# Patient Record
Sex: MALE | Race: WHITE
[De-identification: names, ages, dates, MRNs, and addresses within clinical notes are randomized per-mention and may not be internally consistent; named-entity substitution may affect disease eponyms.]

---

## 2018-02-12 ENCOUNTER — HOSPITAL ENCOUNTER (OUTPATIENT)
Dept: HOSPITAL 47 - OR | Age: 63
Discharge: HOME | End: 2018-02-12
Payer: COMMERCIAL

## 2018-02-12 VITALS — TEMPERATURE: 97.1 F | RESPIRATION RATE: 16 BRPM

## 2018-02-12 VITALS — BODY MASS INDEX: 30.1 KG/M2

## 2018-02-12 VITALS — HEART RATE: 62 BPM | DIASTOLIC BLOOD PRESSURE: 70 MMHG | SYSTOLIC BLOOD PRESSURE: 111 MMHG

## 2018-02-12 DIAGNOSIS — N28.9: ICD-10-CM

## 2018-02-12 DIAGNOSIS — Z85.820: ICD-10-CM

## 2018-02-12 DIAGNOSIS — G56.01: Primary | ICD-10-CM

## 2018-02-12 DIAGNOSIS — E78.5: ICD-10-CM

## 2018-02-12 DIAGNOSIS — Z79.899: ICD-10-CM

## 2018-02-12 DIAGNOSIS — Z79.82: ICD-10-CM

## 2018-02-12 DIAGNOSIS — I10: ICD-10-CM

## 2018-02-12 DIAGNOSIS — Z87.891: ICD-10-CM

## 2018-02-12 DIAGNOSIS — M18.0: ICD-10-CM

## 2018-02-12 DIAGNOSIS — E89.0: ICD-10-CM

## 2018-02-12 PROCEDURE — 64721 CARPAL TUNNEL SURGERY: CPT

## 2018-02-12 NOTE — OP
OPERATIVE REPORT



DATE OF SERVICE:

02/12/2018.



PREOPERATIVE DIAGNOSIS:

Right carpal tunnel syndrome.



POSTOPERATIVE DIAGNOSIS:

Right carpal tunnel syndrome.



DESCRIPTION OF PROCEDURE:

The patient was taken to the Operative Suite where a sedation was administered by the

Department of Anesthesia.  I then performed a local injection along the line of the

incision with a combination of Marcaine and Xylocaine both without epinephrine.



The hand was then prepped and draped in the usual manner.  The arm was elevated,

exsanguinated and the cuff was inflated to 250 mm of mercury.



A longitudinal incision was made along the ring finger ray distal to the wrist crease.

Dissection was taken through the skin and subcutaneous tissue, initially sharp through

the skin and then blunt through the subcutaneous tissue to ensure protection of any

potential terminal transverse branches of the palmar cutaneous nerve.



The palmar fascia was then incised under direct vision longitudinally exposing the

transverse carpal ligament.  The transverse carpal ligament also was incised under

direct vision.  The dissection was then continued proximally beneath the skin under

direct vision to release the distal forearm fascia.



The median nerve was then reflected free of tenosynovium to ensure no adhesions.



The tourniquet was then released.  The wound was then irrigated and the skin was closed

with a running 5-0 nylon suture. A soft bulky dressing was applied including a volar

plaster splint holding the wrist in a neutral slightly extended position.  The patient

was then taken to the Recovery Room in satisfactory condition.



BARB / MARIE: [id number] / Job#: [id number]

## 2019-01-25 ENCOUNTER — HOSPITAL ENCOUNTER (OUTPATIENT)
Dept: HOSPITAL 47 - LABWHC1 | Age: 64
Discharge: HOME | End: 2019-01-25
Attending: INTERNAL MEDICINE
Payer: COMMERCIAL

## 2019-01-25 DIAGNOSIS — R73.09: ICD-10-CM

## 2019-01-25 DIAGNOSIS — Z12.5: ICD-10-CM

## 2019-01-25 DIAGNOSIS — I12.9: ICD-10-CM

## 2019-01-25 DIAGNOSIS — N18.9: ICD-10-CM

## 2019-01-25 DIAGNOSIS — E78.5: Primary | ICD-10-CM

## 2019-01-25 LAB
ALBUMIN SERPL-MCNC: 4.4 G/DL (ref 3.8–4.9)
ALBUMIN/GLOB SERPL: 2.2 G/DL (ref 1.2–2.1)
ALP SERPL-CCNC: 37 U/L (ref 41–126)
ALT SERPL-CCNC: 33 U/L (ref 10–49)
ANION GAP SERPL CALC-SCNC: 7.4 MMOL/L (ref 4–12)
AST SERPL-CCNC: 37 U/L (ref 14–35)
BASOPHILS # BLD AUTO: 0.1 K/UL (ref 0–0.2)
BASOPHILS NFR BLD AUTO: 1 %
BUN SERPL-SCNC: 24 MG/DL (ref 9–27)
CALCIUM SPEC-MCNC: 9.9 MG/DL (ref 8.7–10.3)
CHLORIDE SERPL-SCNC: 109 MMOL/L (ref 96–109)
CHOLEST SERPL-MCNC: 176 MG/DL (ref 0–200)
CO2 SERPL-SCNC: 25.6 MMOL/L (ref 21.6–31.8)
EOSINOPHIL # BLD AUTO: 0.5 K/UL (ref 0–0.7)
EOSINOPHIL NFR BLD AUTO: 7 %
ERYTHROCYTE [DISTWIDTH] IN BLOOD BY AUTOMATED COUNT: 5.7 M/UL (ref 4.3–5.9)
ERYTHROCYTE [DISTWIDTH] IN BLOOD: 14.8 % (ref 11.5–15.5)
GLOBULIN SER CALC-MCNC: 2 G/DL (ref 1.6–3.3)
GLUCOSE SERPL-MCNC: 97 MG/DL (ref 70–110)
HBA1C MFR BLD: 6.3 % (ref 4–6)
HCT VFR BLD AUTO: 51.4 % (ref 39–53)
HDLC SERPL-MCNC: 31 MG/DL (ref 40–60)
HGB BLD-MCNC: 17 GM/DL (ref 13–17.5)
LDLC SERPL CALC-MCNC: 101.6 MG/DL (ref 0–131)
LYMPHOCYTES # SPEC AUTO: 2.4 K/UL (ref 1–4.8)
LYMPHOCYTES NFR SPEC AUTO: 37 %
MCH RBC QN AUTO: 29.8 PG (ref 25–35)
MCHC RBC AUTO-ENTMCNC: 33 G/DL (ref 31–37)
MCV RBC AUTO: 90.3 FL (ref 80–100)
MONOCYTES # BLD AUTO: 0.6 K/UL (ref 0–1)
MONOCYTES NFR BLD AUTO: 9 %
NEUTROPHILS # BLD AUTO: 2.8 K/UL (ref 1.3–7.7)
NEUTROPHILS NFR BLD AUTO: 43 %
PLATELET # BLD AUTO: 306 K/UL (ref 150–450)
POTASSIUM SERPL-SCNC: 4.5 MMOL/L (ref 3.5–5.5)
PROT SERPL-MCNC: 6.4 G/DL (ref 6.2–8.2)
SODIUM SERPL-SCNC: 142 MMOL/L (ref 135–145)
TRIGL SERPL-MCNC: 217 MG/DL (ref 0–149)
VLDLC SERPL CALC-MCNC: 43.4 MG/DL (ref 5–40)
WBC # BLD AUTO: 6.6 K/UL (ref 3.8–10.6)

## 2019-01-25 PROCEDURE — 36415 COLL VENOUS BLD VENIPUNCTURE: CPT

## 2019-01-25 PROCEDURE — 83036 HEMOGLOBIN GLYCOSYLATED A1C: CPT

## 2019-01-25 PROCEDURE — 80053 COMPREHEN METABOLIC PANEL: CPT

## 2019-01-25 PROCEDURE — 84153 ASSAY OF PSA TOTAL: CPT

## 2019-01-25 PROCEDURE — 85025 COMPLETE CBC W/AUTO DIFF WBC: CPT

## 2019-01-25 PROCEDURE — 80061 LIPID PANEL: CPT

## 2020-05-07 ENCOUNTER — HOSPITAL ENCOUNTER (OUTPATIENT)
Dept: HOSPITAL 47 - LABWHC1 | Age: 65
Discharge: HOME | End: 2020-05-07
Attending: INTERNAL MEDICINE
Payer: COMMERCIAL

## 2020-05-07 DIAGNOSIS — Z00.00: Primary | ICD-10-CM

## 2020-05-07 LAB
ALBUMIN SERPL-MCNC: 4.3 G/DL (ref 3.8–4.9)
ALBUMIN/GLOB SERPL: 2.05 G/DL (ref 1.6–3.17)
ALP SERPL-CCNC: 41 U/L (ref 41–126)
ALT SERPL-CCNC: 41 U/L (ref 10–49)
ANION GAP SERPL CALC-SCNC: 9.2 MMOL/L (ref 4–12)
AST SERPL-CCNC: 43 U/L (ref 14–35)
BASOPHILS # BLD AUTO: 0.1 K/UL (ref 0–0.2)
BASOPHILS NFR BLD AUTO: 2 %
BUN SERPL-SCNC: 20 MG/DL (ref 9–27)
BUN/CREAT SERPL: 13.33 RATIO (ref 12–20)
CALCIUM SPEC-MCNC: 9.6 MG/DL (ref 8.7–10.3)
CHLORIDE SERPL-SCNC: 109 MMOL/L (ref 96–109)
CHOLEST SERPL-MCNC: 147 MG/DL (ref 0–200)
CO2 SERPL-SCNC: 26.8 MMOL/L (ref 21.6–31.8)
EOSINOPHIL # BLD AUTO: 0.3 K/UL (ref 0–0.7)
EOSINOPHIL NFR BLD AUTO: 6 %
ERYTHROCYTE [DISTWIDTH] IN BLOOD BY AUTOMATED COUNT: 6 M/UL (ref 4.3–5.9)
ERYTHROCYTE [DISTWIDTH] IN BLOOD: 14.4 % (ref 11.5–15.5)
GLOBULIN SER CALC-MCNC: 2.1 G/DL (ref 1.6–3.3)
GLUCOSE SERPL-MCNC: 103 MG/DL (ref 70–110)
HCT VFR BLD AUTO: 54 % (ref 39–53)
HDLC SERPL-MCNC: 29 MG/DL (ref 40–60)
HGB BLD-MCNC: 17.2 GM/DL (ref 13–17.5)
LDLC SERPL CALC-MCNC: 78.4 MG/DL (ref 0–131)
LYMPHOCYTES # SPEC AUTO: 1.7 K/UL (ref 1–4.8)
LYMPHOCYTES NFR SPEC AUTO: 34 %
MCH RBC QN AUTO: 28.7 PG (ref 25–35)
MCHC RBC AUTO-ENTMCNC: 31.9 G/DL (ref 31–37)
MCV RBC AUTO: 90 FL (ref 80–100)
MONOCYTES # BLD AUTO: 0.4 K/UL (ref 0–1)
MONOCYTES NFR BLD AUTO: 9 %
NEUTROPHILS # BLD AUTO: 2.3 K/UL (ref 1.3–7.7)
NEUTROPHILS NFR BLD AUTO: 45 %
PLATELET # BLD AUTO: 290 K/UL (ref 150–450)
POTASSIUM SERPL-SCNC: 4.7 MMOL/L (ref 3.5–5.5)
PROT SERPL-MCNC: 6.4 G/DL (ref 6.2–8.2)
SODIUM SERPL-SCNC: 145 MMOL/L (ref 135–145)
TRIGL SERPL-MCNC: 198 MG/DL (ref 0–149)
VLDLC SERPL CALC-MCNC: 39.6 MG/DL (ref 5–40)
WBC # BLD AUTO: 5.2 K/UL (ref 3.8–10.6)

## 2020-05-07 PROCEDURE — 82306 VITAMIN D 25 HYDROXY: CPT

## 2020-05-07 PROCEDURE — 80061 LIPID PANEL: CPT

## 2020-05-07 PROCEDURE — 80053 COMPREHEN METABOLIC PANEL: CPT

## 2020-05-07 PROCEDURE — 85025 COMPLETE CBC W/AUTO DIFF WBC: CPT

## 2020-05-07 PROCEDURE — 84443 ASSAY THYROID STIM HORMONE: CPT

## 2020-05-07 PROCEDURE — 36415 COLL VENOUS BLD VENIPUNCTURE: CPT

## 2020-05-07 PROCEDURE — 84153 ASSAY OF PSA TOTAL: CPT

## 2021-06-30 ENCOUNTER — HOSPITAL ENCOUNTER (OUTPATIENT)
Dept: HOSPITAL 47 - ORWHC2ENDO | Age: 66
Discharge: HOME | End: 2021-06-30
Attending: INTERNAL MEDICINE
Payer: COMMERCIAL

## 2021-06-30 VITALS — BODY MASS INDEX: 28.7 KG/M2

## 2021-06-30 VITALS — SYSTOLIC BLOOD PRESSURE: 125 MMHG | DIASTOLIC BLOOD PRESSURE: 74 MMHG | HEART RATE: 75 BPM | RESPIRATION RATE: 18 BRPM

## 2021-06-30 VITALS — TEMPERATURE: 97.2 F

## 2021-06-30 DIAGNOSIS — Z12.11: Primary | ICD-10-CM

## 2021-06-30 DIAGNOSIS — N18.9: ICD-10-CM

## 2021-06-30 DIAGNOSIS — Z80.0: ICD-10-CM

## 2021-06-30 DIAGNOSIS — Z85.820: ICD-10-CM

## 2021-06-30 DIAGNOSIS — K64.8: ICD-10-CM

## 2021-06-30 DIAGNOSIS — Z90.89: ICD-10-CM

## 2021-06-30 DIAGNOSIS — I12.9: ICD-10-CM

## 2021-06-30 DIAGNOSIS — E78.5: ICD-10-CM

## 2021-06-30 DIAGNOSIS — Z79.899: ICD-10-CM

## 2021-06-30 DIAGNOSIS — Z87.891: ICD-10-CM

## 2021-06-30 PROCEDURE — 45378 DIAGNOSTIC COLONOSCOPY: CPT

## 2021-06-30 NOTE — P.PCN
Date of Procedure: 06/30/21


Procedure(s) Performed: 


BRIEF HISTORY: Patient is a 66-year-old pleasant white female scheduled for an 

elective colonoscopy as a part of screening for colorectal neoplasia.  He does 

have family history of colon cancer in his mother and grandmother in the 66 

respectively.  His last colonoscopy was 5 years ago.





PROCEDURE PERFORMED: Colonoscopy. 





PREOPERATIVE DIAGNOSIS: Screening for colon cancer/family history of colon 

cancer. 





IV sedation per Anesthesia. 





PROCEDURE: After informed consent was obtained, the patient, was brought into 

the endoscopy unit. IV sedation was administered by Anesthesia under continuous 

monitoring.  Digital rectal examination was normal. Initially the Olympus CF-160

flexible video colonoscope was then inserted in the rectum, gradually advanced 

into the cecum without any difficulty. Careful examination was performed as the 

scope was gradually being withdrawn. Ileocecal valve and the appendiceal orifice

were visualized and appeared normal.  Prep was excellent. Mucosa of the cecum, 

ascending colon, transverse colon, descending colon, sigmoid colon, and rectum 

appeared normal. Retroflexion was performed in the rectum and a 2 internal 

hemorrhoids were seen. The patient tolerated the procedure well. 





IMPRESSION: Normal-appearing colon from rectum to cecum with no evidence of 

colorectal neoplasia .





RECOMMENDATIONS:  Findings of this examination were discussed with the patient 

as well as his family.  He was advised to have a repeat screening colonoscopy 

every 5 years because of the strong family history of colon cancer.

## 2022-03-17 ENCOUNTER — HOSPITAL ENCOUNTER (OUTPATIENT)
Dept: HOSPITAL 47 - LABWHC1 | Age: 67
Discharge: HOME | End: 2022-03-17
Attending: INTERNAL MEDICINE
Payer: MEDICARE

## 2022-03-17 DIAGNOSIS — N18.31: ICD-10-CM

## 2022-03-17 DIAGNOSIS — M25.562: ICD-10-CM

## 2022-03-17 DIAGNOSIS — Z12.5: Primary | ICD-10-CM

## 2022-03-17 LAB
ANION GAP SERPL CALC-SCNC: 11.3 MMOL/L (ref 10–18)
BUN SERPL-SCNC: 28.8 MG/DL (ref 9–27)
BUN/CREAT SERPL: 18 RATIO (ref 12–20)
CALCIUM SPEC-MCNC: 9.6 MG/DL (ref 8.7–10.3)
CHLORIDE SERPL-SCNC: 107 MMOL/L (ref 96–109)
CO2 SERPL-SCNC: 21.7 MMOL/L (ref 20–27.5)
GLUCOSE SERPL-MCNC: 97 MG/DL (ref 70–110)
POTASSIUM SERPL-SCNC: 4.2 MMOL/L (ref 3.5–5.5)
PSA SERPL-MCNC: 0.4 NG/ML (ref 0–4)
SODIUM SERPL-SCNC: 140 MMOL/L (ref 135–145)

## 2022-03-17 PROCEDURE — 36415 COLL VENOUS BLD VENIPUNCTURE: CPT

## 2022-03-17 PROCEDURE — 80048 BASIC METABOLIC PNL TOTAL CA: CPT

## 2022-03-17 PROCEDURE — 73562 X-RAY EXAM OF KNEE 3: CPT

## 2022-03-17 NOTE — XR
EXAMINATION TYPE: XR knee complete LT

 

DATE OF EXAM: 3/17/2022

 

CLINICAL HISTORY: pain

 

TECHNIQUE:  Three views of the left knee are obtained.

 

COMPARISON: None.

 

FINDINGS:  There is no acute fracture/dislocation.  The tri-compartment joint spaces appear within no
rmal limits.  The overlying soft tissue appears unremarkable.

 

IMPRESSION:  

There is no acute fracture or dislocation

 

 

ICD 10 NO FRACTURE, INITIAL EVALUATION

## 2023-03-22 ENCOUNTER — HOSPITAL ENCOUNTER (OUTPATIENT)
Dept: HOSPITAL 47 - LABWHC1 | Age: 68
Discharge: HOME | End: 2023-03-22
Attending: INTERNAL MEDICINE
Payer: MEDICARE

## 2023-03-22 DIAGNOSIS — E78.5: ICD-10-CM

## 2023-03-22 DIAGNOSIS — R73.09: ICD-10-CM

## 2023-03-22 DIAGNOSIS — N18.31: ICD-10-CM

## 2023-03-22 DIAGNOSIS — E55.9: ICD-10-CM

## 2023-03-22 DIAGNOSIS — Z12.5: Primary | ICD-10-CM

## 2023-03-22 LAB
ALBUMIN SERPL-MCNC: 4.3 G/DL (ref 3.8–4.9)
ALBUMIN/GLOB SERPL: 1.87 G/DL (ref 1.6–3.17)
ALP SERPL-CCNC: 41 U/L (ref 41–126)
ALT SERPL-CCNC: 35 U/L (ref 10–49)
ANION GAP SERPL CALC-SCNC: 8.9 MMOL/L (ref 10–18)
AST SERPL-CCNC: 33 U/L (ref 14–35)
BASOPHILS # BLD AUTO: 0.11 X 10*3/UL (ref 0–0.1)
BASOPHILS NFR BLD AUTO: 1.8 %
BUN SERPL-SCNC: 29.5 MG/DL (ref 9–27)
BUN/CREAT SERPL: 18.44 RATIO (ref 12–20)
CALCIUM SPEC-MCNC: 9.7 MG/DL (ref 8.7–10.3)
CHLORIDE SERPL-SCNC: 106 MMOL/L (ref 96–109)
CHOLEST SERPL-MCNC: 186 MG/DL (ref 0–200)
CO2 SERPL-SCNC: 26.1 MMOL/L (ref 20–27.5)
EOSINOPHIL # BLD AUTO: 0.43 X 10*3/UL (ref 0.04–0.35)
EOSINOPHIL NFR BLD AUTO: 7 %
ERYTHROCYTE [DISTWIDTH] IN BLOOD BY AUTOMATED COUNT: 5.51 X 10*6/UL (ref 4.4–5.6)
ERYTHROCYTE [DISTWIDTH] IN BLOOD: 15.3 % (ref 11.5–14.5)
GLOBULIN SER CALC-MCNC: 2.3 G/DL (ref 1.6–3.3)
GLUCOSE SERPL-MCNC: 107 MG/DL (ref 70–110)
HCT VFR BLD AUTO: 50.2 % (ref 39.6–50)
HDLC SERPL-MCNC: 17.5 MG/DL (ref 40–60)
HGB BLD-MCNC: 16.3 G/DL (ref 13–17)
IMM GRANULOCYTES BLD QL AUTO: 0.2 %
LDLC SERPL CALC-MCNC: 98.3 MG/DL (ref 0–131)
LYMPHOCYTES # SPEC AUTO: 2.22 X 10*3/UL (ref 0.9–5)
LYMPHOCYTES NFR SPEC AUTO: 36.3 %
MCH RBC QN AUTO: 29.6 PG (ref 27–32)
MCHC RBC AUTO-ENTMCNC: 32.5 G/DL (ref 32–37)
MCV RBC AUTO: 91.1 FL (ref 80–97)
MONOCYTES # BLD AUTO: 0.64 X 10*3/UL (ref 0.2–1)
MONOCYTES NFR BLD AUTO: 10.5 %
NEUTROPHILS # BLD AUTO: 2.71 X 10*3/UL (ref 1.8–7.7)
NEUTROPHILS NFR BLD AUTO: 44.2 %
NRBC BLD AUTO-RTO: 0 /100 WBCS (ref 0–0)
PLATELET # BLD AUTO: 302 X 10*3/UL (ref 140–440)
POTASSIUM SERPL-SCNC: 4.3 MMOL/L (ref 3.5–5.5)
PROT SERPL-MCNC: 6.6 G/DL (ref 6.2–8.2)
PSA SERPL-MCNC: 0.4 NG/ML (ref 0–4.5)
SODIUM SERPL-SCNC: 141 MMOL/L (ref 135–145)
TRIGL SERPL-MCNC: 351 MG/DL (ref 0–149)
VLDLC SERPL CALC-MCNC: 70.2 MG/DL (ref 5–40)
WBC # BLD AUTO: 6.12 X 10*3/UL (ref 4.5–10)

## 2023-03-22 PROCEDURE — 84100 ASSAY OF PHOSPHORUS: CPT

## 2023-03-22 PROCEDURE — 84153 ASSAY OF PSA TOTAL: CPT

## 2023-03-22 PROCEDURE — 85025 COMPLETE CBC W/AUTO DIFF WBC: CPT

## 2023-03-22 PROCEDURE — 82306 VITAMIN D 25 HYDROXY: CPT

## 2023-03-22 PROCEDURE — 80061 LIPID PANEL: CPT

## 2023-03-22 PROCEDURE — 80053 COMPREHEN METABOLIC PANEL: CPT

## 2023-03-22 PROCEDURE — 36415 COLL VENOUS BLD VENIPUNCTURE: CPT

## 2023-03-22 PROCEDURE — 83036 HEMOGLOBIN GLYCOSYLATED A1C: CPT
